# Patient Record
Sex: FEMALE | Race: ASIAN | Employment: UNEMPLOYED | URBAN - METROPOLITAN AREA
[De-identification: names, ages, dates, MRNs, and addresses within clinical notes are randomized per-mention and may not be internally consistent; named-entity substitution may affect disease eponyms.]

---

## 2021-09-01 NOTE — PROGRESS NOTES
Assessment & Plan     (R00.2) Palpitations    Comment: Tachycardic on initial VS but improves on my physical exam to <100. Consider SVT vs atrial arrhythmia vs other dysrhythmia. Do not see evidence of these on EKG but will need further eval with event monitor. If she does have a rate/rhythm disturbance, question is 2/2 to what? Strong possibility that her symptoms are caused by or at least exacerbated by mental health situation as below. Does not describe classic features associated with thyroid abnormality, though this is a consideration. No overt symptoms of blood loss to suggest anemia; did have irregular menstrual period in 2016 - did not focus on this today (and she did not offer it as a troubling symptom) but possibility that this is ongoing and causing anemia now. Possibility of pregnancy causing sx but does not describe other features associated with pregnancy. Does not describe symptoms of fever/infection. No reported history of illicit drug use or really any medication use to suggest side effect / toxicity / withdrawal as a cause. I do not have a strong suspicion for structural heart disease (ie valvular abnormality / cardiomyopathy (ischemic vs other), though these remain considerations.   Plan:   - EKG 12-lead complete w/read - Clinics  - TSH, will call with result   - Consider further discussion of menstrual period / bleeding, birth control, sexual history / possibility of pregnancy   - Consideration for further bloodwork - CBC, BMP, UPT to evaluate for anemia and/or electrolyte derangement   - Event monitor to try to capture these palpitations to see if rhythm change    Order: Cardiac Event Monitor Adult Pediatric   - Consider TTE   - Consider cardiology if event monitor reveals arrhythmia   - Will not provide any specific medications at this time as we do not have a definitive diagnosis   - Discussed reasons to come back to clinic / present to ED   - Follow up visit in 2-3 weeks (or whenever  event monitor data is gathered)    (R07.81) Pleuritic chest pain  Comment: Vague description but seems to her breathing and chest symptoms seem to be most consistent with pleuritic chest pain that may be exacerbated by certain positions (ie laying on on back at night). This (in combination with palpitations) warrants broad ddx. She is tachycardic but VS otherwise normal and reassuring. Considerations do include MSK causes (costochondritis vs rib somatic dysfunction vs muscle strain) especially with TTP on exam vs GI (GERD vs esophageal spasm vs other) especially with nighttime sx (ie while laying down). High suspicion for mental health contributing given brother's suicide, which she is struggling with. Pulmonary causes are certainly considered, such as asthma / obstructive lung disease (felt less likely without wheezing), PE (Wells score 0-1.5 for tachycardia at most, regardless low risk group), inflammation of pleural lining (aka pleurisy); feel acute cause such as pneumothorax, hemothorax, pleural effusion, pneumonia unlikely given chronicity. Cardiac causes, such as MI, pericarditis, among others considered as well but felt to be less likely without classic description and no EKG findings to support.    Plan:  - EKG/TSH/Cadiac monitor   - Further workup consideration (at future visit if sx ongoing): other bloodwork as elsewhere, CXR, possibly PFTs   - Management:    Encouraged Tylenol/NSAIDs/ice/heat for now - she declined prescription today    Consider trial of acid blocking medication, ie tums vs H2 vs PPI given consideration of GI pathology, at least encourage having bed upright and other conservative measures    If workup reveals alternate pathology, will provide more targeted therapy   - Discussed reasons to come back to clinic / present to ED   - Follow up visit in 2-3 weeks (or whenever event monitor data is gathered)    (F43.21) Adjustment disorder with depressed mood  (Z81.8) Family history of  "suicide  Comment: Brother recently  by suicide. She is having a tough time adjusting. She is not suicidal at this time.   Plan:   - Continue to lean on family for support   - Provided crisis numbers today   - Had SW meet with patient today, see his note, appreciate   - She would like mental health therapy: [facility]    - Follow up visit 2-3 weeks to further discuss and consider pharmacotherapy     Lorie Meza DO  [facility]    Precepted with Dr. Cox       Valentine Warner is a 25 year old who presents for the following health issues    Patient presents with:  Breathing Problem: hard to breathe and feels like heart burn- started in March- had COVID in 2020 got vaccinated April w/ [employer]  Medication Reconciliation    HPI     Concern - \"Can't take a deep breath\"   Onset: March (5 months)   Description: can't take a deep breath   \"Manually have to breathe\"   Previous history of similar problem: no   Precipitating factors:        Worsened by: at night, laying on my back          Not worse with exercise    Alleviating factors:        Improved by: - have to lay on my front to feel like I can breathe normally   Therapies tried and outcome: None, no meds   Have tried stretches - doesn't feel like it changes     ROS:   \"Heart burn\" - started 5 months ago also    Center to left chest     Course: worse in  or July (1-3 months ago)    Radiation: none     Description: burning      Sharp pain - 1-2x per week, usually at night when she tries to sleep      Comes and goes in an hour      Intensity: 5/10 once or twice 7/10      Exacerbating factors: none      Remitting factors: none     Shortness of breath: with hard exercise   No wheezing   No prior diagnoses     Sweating: no  Nausea/vomiting: no  Lightheadedness: YES - when moving or trying to catch her breath   Palpitations: YES    Couldn't sleep at night because her heart would just race " "- Would lay there for hours   Mood - low, Brother  of suicide last month    Extra hard because they were very close    Leaning on family for support right now    She does not have any personal thoughts of harming self or   Grandma is sick (not sure with what) - no other ill contacts  Weight- no gain or loss   No hair or nail changes   No diarrhea or stool changes   Fever/Chills: no  Cough: no           No belly pain      History:   Family history of heart disease: no   Asthma in 2 brothers - when they were little   Tobacco use: no  Illicit drugs: no   Medications: tylenol, Excedrin - haven't taken in a month or two      Had COVID in 2020, vaccinated in April     Review of Systems   Constitutional, HEENT, cardiovascular, pulmonary, gi and gu systems are negative, except as otherwise noted.        Objective    /80   Pulse 111   Temp 97.8  F (36.6  C) (Oral)   Ht 1.59 m (5' 2.6\")   Wt 55.3 kg (122 lb)   LMP 2020   SpO2 99%   BMI 21.89 kg/m    Body mass index is 21.89 kg/m .  Physical Exam   GENERAL: healthy, alert. No respiratory distress. Upset when discussing brother.   EYES: Eyes grossly normal to inspection, and conjunctivae and sclerae normal.   NECK: no overt asymmetry, no palpated thyroid asymmetry or nodule.   RESP: breathing comfortably. lungs clear to auscultation - no rales, rhonchi or wheezes  CV: regular rate (on faster side in 80s-90s) and rhythm, no murmur, no peripheral edema and peripheral pulses strong  ABDOMEN: soft, nontender,  no masses and bowel sounds normal  MS: Tenderness to palpation of chest wall diffusely - no specific areas over another. no gross musculoskeletal defects noted, no edema.   SKIN: no suspicious lesions or rashes.   NEURO: Normal strength and tone, mentation intact and speech normal. Non-focal.   PSYCH: mentation appears normal, affect restricted. Soft voice. In tears discussing brother's death. No SI/HI.          EKG Interpretation:      Interpreted " by Lorie Meza DO and Bridgett oCx   Reviewed 9/2/21   Symptoms at time of EKG: None (complained of palpitations prompting EKG)   Rhythm: Normal sinus   Rate: Normal  Axis: Normal  Ectopy: None  Conduction: Normal  ST Segments/ T Waves: No ST-T wave changes and No acute ischemic changes  Q Waves: None  Comparison to prior: No old EKG available    Clinical Impression: normal EKG

## 2021-09-02 ENCOUNTER — OFFICE VISIT (OUTPATIENT)
Dept: FAMILY MEDICINE | Facility: CLINIC | Age: 26
End: 2021-09-02

## 2021-09-02 VITALS
BODY MASS INDEX: 21.62 KG/M2 | SYSTOLIC BLOOD PRESSURE: 117 MMHG | WEIGHT: 122 LBS | OXYGEN SATURATION: 99 % | HEIGHT: 63 IN | TEMPERATURE: 97.8 F | DIASTOLIC BLOOD PRESSURE: 80 MMHG | HEART RATE: 111 BPM

## 2021-09-02 DIAGNOSIS — Z81.8 FAMILY HISTORY OF SUICIDE: ICD-10-CM

## 2021-09-02 DIAGNOSIS — F43.21 ADJUSTMENT DISORDER WITH DEPRESSED MOOD: ICD-10-CM

## 2021-09-02 DIAGNOSIS — R07.81 PLEURITIC CHEST PAIN: ICD-10-CM

## 2021-09-02 DIAGNOSIS — R00.2 PALPITATIONS: Primary | ICD-10-CM

## 2021-09-02 LAB — TSH SERPL DL<=0.005 MIU/L-ACNC: 1.22 UIU/ML (ref 0.3–5)

## 2021-09-02 PROCEDURE — 99214 OFFICE O/P EST MOD 30 MIN: CPT | Mod: 25 | Performed by: STUDENT IN AN ORGANIZED HEALTH CARE EDUCATION/TRAINING PROGRAM

## 2021-09-02 PROCEDURE — 84443 ASSAY THYROID STIM HORMONE: CPT | Performed by: STUDENT IN AN ORGANIZED HEALTH CARE EDUCATION/TRAINING PROGRAM

## 2021-09-02 PROCEDURE — 93000 ELECTROCARDIOGRAM COMPLETE: CPT | Performed by: STUDENT IN AN ORGANIZED HEALTH CARE EDUCATION/TRAINING PROGRAM

## 2021-09-02 PROCEDURE — 36415 COLL VENOUS BLD VENIPUNCTURE: CPT | Performed by: STUDENT IN AN ORGANIZED HEALTH CARE EDUCATION/TRAINING PROGRAM

## 2021-09-02 ASSESSMENT — MIFFLIN-ST. JEOR: SCORE: 1271.13

## 2021-09-02 NOTE — LETTER
September 3, 2021      Alana Hyman  [address] [address]        Dear ,    We are writing to inform you of your test results.    Percy Warner,     Thanks for coming in. Your thyroid test was normal, which is reassuring. Your EKG (heart tracing) from yesterday was also normal.    I have tried calling a few times but have not been able to get a hold of you. My goal is to see how you are feeling and also to arrange our ongoing plan/follow up.    My hope is to see you back in clinic in 2-3 weeks time and see how your symptoms are, including your mood. Please work with my staff to arragnge this.    Please call when you have a chance. Look forward to speaking with you.    Resulted Orders   TSH   Result Value Ref Range    TSH 1.22 0.30 - 5.00 uIU/mL       If you have any questions or concerns, please call the clinic at the number listed above.       Sincerely,    Lorie Meza, DO

## 2021-09-02 NOTE — PROGRESS NOTES
Reason for SW Follow Up: Dr. Meza requested SW follow-up with patient regarding recent suicide in family.    Visit Initiated By: Clinic    Met With: Alana Hyman    Summary and Notes: SW met with patient during patient office visit this date. Patient willing to discuss suicide of brother in past month briefly with SW this date. Patient noted feeling down, thoughts of what 'could/should have' been done to prevent this outcome, and emotional emptiness of lost sibling. Patient and SW briefly processed shame/stigma of mental health and suicide which patient acknowledged but did not elaborate on. Patient reported not having questions or specific concerns at this time and denied suicidal ideation/intent this date. Patient provided SW contact information for support and/or resources if/as needed.    Follow-Up Suggestions:   -Patient to consider seeing a professional or paraprofessional regarding grief and loss and consider grief support groups or resources in the community.  -Patient or family members to call SW if support/resources are needed and/or wanted.  -[facility] cultural  services available ([phone]).    LEN HOPE, JIGAR, [facility]

## 2021-09-02 NOTE — PATIENT INSTRUCTIONS
Below are resources in case you experience an increase in symptoms or feel you are in crisis:     Mental Health Crisis Lines    [facility]:     Adult Crisis Line ([phone])    Children's Crisis Line([phone])  [facility]:  Crisis Connection  ([phone])    Hmong Crisis Line   ([phone])       [facility] Multilingual Crisis Line  ([phone])        Urgent Care  ([phone])     [address] [place] [address]       Provides mental health crisis assessments and Rule 25 assessments     Walk-in appointments available        Walk-in Counseling - [facility]   [facility]     ([phone])   [facility]  ([phone])       Acute Psychiatric Emergency / Crisis  If you are having an acute psychiatric emergency, please call 911 or have someone drive you directly to a hospital for further evaluation.    [facility]  [phone]    [facility]   [phone]       Other Resources    Crisis Housing [facility]:  [facility]      ([phone])    Poison Control  ([phone])

## 2021-09-02 NOTE — PROGRESS NOTES
Preceptor Attestation:   Patient seen, evaluated and discussed with the resident. I personally viewed the EKG and agree with the interpretation documented by the resident. I have verified the content of the note, which accurately reflects my assessment of the patient and the plan of care.  Supervising Physician:Bridgett Cox MD  [facility]

## 2021-09-06 PROBLEM — Z81.8 FAMILY HISTORY OF SUICIDE: Status: ACTIVE | Noted: 2021-09-06

## 2021-09-06 PROBLEM — F43.21 ADJUSTMENT DISORDER WITH DEPRESSED MOOD: Status: ACTIVE | Noted: 2021-09-06

## 2021-09-06 PROBLEM — R07.81 PLEURITIC CHEST PAIN: Status: ACTIVE | Noted: 2021-09-06

## 2021-09-06 PROBLEM — R00.2 PALPITATIONS: Status: ACTIVE | Noted: 2021-09-06

## 2021-09-09 ENCOUNTER — DOCUMENTATION ONLY (OUTPATIENT)
Dept: PSYCHOLOGY | Facility: CLINIC | Age: 26
End: 2021-09-09

## 2021-09-09 ENCOUNTER — TELEPHONE (OUTPATIENT)
Dept: FAMILY MEDICINE | Facility: CLINIC | Age: 26
End: 2021-09-09

## 2021-09-09 NOTE — PROGRESS NOTES
Patient experiencing grief following brother's suicide. Patient herself not experiencing suicidal thoughts. Please connect with any of the following:    [facility]- virtual or telephone care only  [address] [facility]  [address]  [place] [address]  [phone] Phone  [phone] Fax  Hours:  Monday - Friday 8:30 - 5:00pm  8:00am - 5:00pm Saturday    [facility]- some providers offering in person care  [address]  [place] [address]  [phone] Phone  [phone] Fax  M-F 7:30AM-7PM  Saturday 8AM-2PM    [facility] ([facility])- some providers offering in person care  [address] [place] [address] [address],   [place] [address]  [phone] Phone  [phone] Fax  M-Th: 8:30-5pm  F: 8:30-4:30pm    [facility]- both virtual and in person, many offering in person  [address] [place] [address]  [phone] Phone  [phone] Fax  M-Th 8-8pm  F 8-4:30pm          Patient Demographics    Patient Name   Alana Hyman MRN   [id number] Legal Sex   Female    1995 [address]   [id number] Address   [address] [place] [id number] Phone   [phone] (Home)   [phone] (Mobile) *Preferred*   Primary Coverage    Payer Plan Sponsor Code Group Number Group Name Payer Address Payer Phone   No coverage found         Order Information    Date Department Ordering Authorizing   2021 [facility] Lorie Meza MD Smithson, Angela Jw, MD   Order Providers    Authorizing Provider Encounter Provider   Bridgett Cox MD Morkeberg, Torbjorn, MD   Future Order Information    Expected Expires      2021 (Approximate) 2022             Associated Diagnoses    Adjustment disorder with depressed mood       Comments     needed: No   Language: English     Patient's brother committed suicide this summer. She is having a  hard time coping.   No personal suicidal ideation herself.          Order Questions    Question Answer Comment   Reason for Referral: Adjustment disorder/Stress    Adult or Child/Adolescent: Adult    Type of Referral (Indicate all that apply): Psychotherapy - for diagnosis and non-pharmacological treatment    Currently receiving mental health services (if 'Yes', use free imani box - what services and why today's referral?) No    Previous psych hospitalization: Unknown

## 2021-09-09 NOTE — TELEPHONE ENCOUNTER
SW left voicemail this date for patient with SW contact information for patient to return call to discuss resources available for patient based on conversation with SW on 9/2/21 since no name attached to voicemail.    LEN HOPE, JIGAR, LADC

## 2021-11-14 ENCOUNTER — HEALTH MAINTENANCE LETTER (OUTPATIENT)
Age: 26
End: 2021-11-14

## 2022-11-21 ENCOUNTER — HEALTH MAINTENANCE LETTER (OUTPATIENT)
Age: 27
End: 2022-11-21

## 2023-11-25 ENCOUNTER — HEALTH MAINTENANCE LETTER (OUTPATIENT)
Age: 28
End: 2023-11-25